# Patient Record
Sex: MALE | Race: WHITE | Employment: UNEMPLOYED | URBAN - METROPOLITAN AREA
[De-identification: names, ages, dates, MRNs, and addresses within clinical notes are randomized per-mention and may not be internally consistent; named-entity substitution may affect disease eponyms.]

---

## 2023-03-01 ENCOUNTER — HOSPITAL ENCOUNTER (EMERGENCY)
Age: 73
Discharge: NURSING, REHAB OR LONG-TERM CARE | End: 2023-03-01
Attending: EMERGENCY MEDICINE
Payer: COMMERCIAL

## 2023-03-01 ENCOUNTER — APPOINTMENT (OUTPATIENT)
Dept: GENERAL RADIOLOGY | Age: 73
End: 2023-03-01
Payer: COMMERCIAL

## 2023-03-01 VITALS
DIASTOLIC BLOOD PRESSURE: 84 MMHG | TEMPERATURE: 97.4 F | HEART RATE: 89 BPM | OXYGEN SATURATION: 96 % | SYSTOLIC BLOOD PRESSURE: 134 MMHG | RESPIRATION RATE: 14 BRPM

## 2023-03-01 DIAGNOSIS — R07.9 CHEST PAIN, UNSPECIFIED TYPE: Primary | ICD-10-CM

## 2023-03-01 LAB
ALBUMIN SERPL-MCNC: 4.1 GM/DL (ref 3.4–5)
ALP BLD-CCNC: 172 IU/L (ref 40–129)
ALT SERPL-CCNC: 25 U/L (ref 10–40)
ANION GAP SERPL CALCULATED.3IONS-SCNC: 8 MMOL/L (ref 4–16)
AST SERPL-CCNC: 31 IU/L (ref 15–37)
BASOPHILS ABSOLUTE: 0.1 K/CU MM
BASOPHILS RELATIVE PERCENT: 1 % (ref 0–1)
BILIRUB SERPL-MCNC: 0.3 MG/DL (ref 0–1)
BUN SERPL-MCNC: 21 MG/DL (ref 6–23)
CALCIUM SERPL-MCNC: 9 MG/DL (ref 8.3–10.6)
CHLORIDE BLD-SCNC: 94 MMOL/L (ref 99–110)
CO2: 29 MMOL/L (ref 21–32)
CREAT SERPL-MCNC: 1.2 MG/DL (ref 0.9–1.3)
DIFFERENTIAL TYPE: ABNORMAL
EKG ATRIAL RATE: 220 BPM
EKG DIAGNOSIS: NORMAL
EKG Q-T INTERVAL: 334 MS
EKG QRS DURATION: 80 MS
EKG QTC CALCULATION (BAZETT): 417 MS
EKG R AXIS: -23 DEGREES
EKG T AXIS: 76 DEGREES
EKG VENTRICULAR RATE: 94 BPM
EOSINOPHILS ABSOLUTE: 0.3 K/CU MM
EOSINOPHILS RELATIVE PERCENT: 4.4 % (ref 0–3)
GFR SERPL CREATININE-BSD FRML MDRD: >60 ML/MIN/1.73M2
GLUCOSE SERPL-MCNC: 140 MG/DL (ref 70–99)
HCT VFR BLD CALC: 44.8 % (ref 42–52)
HEMOGLOBIN: 14 GM/DL (ref 13.5–18)
IMMATURE NEUTROPHIL %: 1 % (ref 0–0.43)
LYMPHOCYTES ABSOLUTE: 1.5 K/CU MM
LYMPHOCYTES RELATIVE PERCENT: 21.3 % (ref 24–44)
MCH RBC QN AUTO: 26.3 PG (ref 27–31)
MCHC RBC AUTO-ENTMCNC: 31.3 % (ref 32–36)
MCV RBC AUTO: 84.2 FL (ref 78–100)
MONOCYTES ABSOLUTE: 0.9 K/CU MM
MONOCYTES RELATIVE PERCENT: 12.6 % (ref 0–4)
NUCLEATED RBC %: 0 %
PDW BLD-RTO: 15.9 % (ref 11.7–14.9)
PLATELET # BLD: 250 K/CU MM (ref 140–440)
PMV BLD AUTO: 10 FL (ref 7.5–11.1)
POTASSIUM SERPL-SCNC: 4.1 MMOL/L (ref 3.5–5.1)
RBC # BLD: 5.32 M/CU MM (ref 4.6–6.2)
SEGMENTED NEUTROPHILS ABSOLUTE COUNT: 4.1 K/CU MM
SEGMENTED NEUTROPHILS RELATIVE PERCENT: 59.7 % (ref 36–66)
SODIUM BLD-SCNC: 131 MMOL/L (ref 135–145)
TOTAL IMMATURE NEUTOROPHIL: 0.07 K/CU MM
TOTAL NUCLEATED RBC: 0 K/CU MM
TOTAL PROTEIN: 7.6 GM/DL (ref 6.4–8.2)
TROPONIN T: <0.01 NG/ML
WBC # BLD: 6.8 K/CU MM (ref 4–10.5)

## 2023-03-01 PROCEDURE — 93010 ELECTROCARDIOGRAM REPORT: CPT | Performed by: INTERNAL MEDICINE

## 2023-03-01 PROCEDURE — 80053 COMPREHEN METABOLIC PANEL: CPT

## 2023-03-01 PROCEDURE — 99283 EMERGENCY DEPT VISIT LOW MDM: CPT

## 2023-03-01 PROCEDURE — 84484 ASSAY OF TROPONIN QUANT: CPT

## 2023-03-01 PROCEDURE — 71045 X-RAY EXAM CHEST 1 VIEW: CPT

## 2023-03-01 PROCEDURE — 85025 COMPLETE CBC W/AUTO DIFF WBC: CPT

## 2023-03-01 PROCEDURE — 93005 ELECTROCARDIOGRAM TRACING: CPT | Performed by: EMERGENCY MEDICINE

## 2023-03-01 RX ORDER — ALLOPURINOL 100 MG/1
100 TABLET ORAL DAILY
COMMUNITY

## 2023-03-01 RX ORDER — TEMAZEPAM 15 MG/1
7.5 CAPSULE ORAL NIGHTLY PRN
COMMUNITY

## 2023-03-01 RX ORDER — GUAIFENESIN AND DEXTROMETHORPHAN HYDROBROMIDE 100; 10 MG/5ML; MG/5ML
5 SOLUTION ORAL EVERY 4 HOURS PRN
COMMUNITY

## 2023-03-01 RX ORDER — ASPIRIN 81 MG/1
81 TABLET ORAL DAILY
COMMUNITY

## 2023-03-01 RX ORDER — TAMSULOSIN HYDROCHLORIDE 0.4 MG/1
0.4 CAPSULE ORAL DAILY
COMMUNITY

## 2023-03-01 RX ORDER — DILTIAZEM HYDROCHLORIDE 240 MG/1
240 CAPSULE, EXTENDED RELEASE ORAL DAILY
COMMUNITY

## 2023-03-01 RX ORDER — TRAZODONE HYDROCHLORIDE 100 MG/1
100 TABLET ORAL NIGHTLY
COMMUNITY

## 2023-03-01 RX ORDER — MAGNESIUM OXIDE 400 MG/1
400 TABLET ORAL DAILY
COMMUNITY

## 2023-03-01 RX ORDER — CHOLECALCIFEROL (VITAMIN D3) 1250 MCG
CAPSULE ORAL
COMMUNITY

## 2023-03-01 RX ORDER — ROPINIROLE 0.5 MG/1
0.5 TABLET, FILM COATED ORAL 3 TIMES DAILY
COMMUNITY

## 2023-03-01 RX ORDER — ACETAMINOPHEN 325 MG/1
650 TABLET ORAL EVERY 6 HOURS PRN
COMMUNITY

## 2023-03-01 RX ORDER — HYDROCHLOROTHIAZIDE 25 MG/1
25 TABLET ORAL DAILY
COMMUNITY

## 2023-03-01 RX ORDER — FUROSEMIDE 20 MG/1
20 TABLET ORAL 2 TIMES DAILY
COMMUNITY

## 2023-03-01 RX ORDER — AMOXICILLIN 250 MG
1 CAPSULE ORAL DAILY
COMMUNITY

## 2023-03-01 RX ORDER — DULOXETIN HYDROCHLORIDE 60 MG/1
60 CAPSULE, DELAYED RELEASE ORAL DAILY
COMMUNITY

## 2023-03-01 RX ORDER — BUSPIRONE HYDROCHLORIDE 10 MG/1
10 TABLET ORAL 3 TIMES DAILY
COMMUNITY

## 2023-03-01 RX ORDER — CYCLOBENZAPRINE HCL 5 MG
5 TABLET ORAL 3 TIMES DAILY PRN
COMMUNITY

## 2023-03-01 RX ORDER — POLYETHYLENE GLYCOL 3350 17 G/17G
17 POWDER, FOR SOLUTION ORAL DAILY
COMMUNITY

## 2023-03-01 RX ORDER — TRAMADOL HYDROCHLORIDE 50 MG/1
50 TABLET ORAL EVERY 6 HOURS PRN
COMMUNITY

## 2023-03-01 RX ORDER — PANTOPRAZOLE SODIUM 40 MG/1
40 GRANULE, DELAYED RELEASE ORAL
COMMUNITY

## 2023-03-01 RX ORDER — ATORVASTATIN CALCIUM 40 MG/1
40 TABLET, FILM COATED ORAL DAILY
COMMUNITY

## 2023-03-01 RX ORDER — DIVALPROEX SODIUM 125 MG/1
125 CAPSULE, COATED PELLETS ORAL 2 TIMES DAILY
COMMUNITY

## 2023-03-01 RX ORDER — POTASSIUM CHLORIDE 1.5 G/1.77G
10 POWDER, FOR SOLUTION ORAL 2 TIMES DAILY
COMMUNITY

## 2023-03-01 RX ORDER — CILOSTAZOL 50 MG/1
50 TABLET ORAL 2 TIMES DAILY
COMMUNITY

## 2023-03-01 NOTE — ED NOTES
Report called to Enriqueta at [facility] nursing [facility] at this time.       Adam Villasenor, KIERRA  03/01/23 7086

## 2023-04-25 ENCOUNTER — OFFICE (OUTPATIENT)
Dept: URBAN - METROPOLITAN AREA CLINIC 16 | Facility: CLINIC | Age: 73
End: 2023-04-25
Payer: MEDICAID

## 2023-04-25 VITALS
DIASTOLIC BLOOD PRESSURE: 72 MMHG | OXYGEN SATURATION: 96 % | WEIGHT: 239 LBS | HEART RATE: 110 BPM | SYSTOLIC BLOOD PRESSURE: 128 MMHG | HEIGHT: 68 IN

## 2023-04-25 DIAGNOSIS — R05.9 COUGH, UNSPECIFIED: ICD-10-CM

## 2023-04-25 DIAGNOSIS — R13.10 DYSPHAGIA, UNSPECIFIED: ICD-10-CM

## 2023-04-25 PROCEDURE — 99214 OFFICE O/P EST MOD 30 MIN: CPT | Performed by: INTERNAL MEDICINE

## 2023-04-25 NOTE — SERVICEHPINOTES
Justin Bullock   is a   72   year old   male   patient who is seen   at the request of   Osvaldo Murrell   for a consultation/initial visit.Justin is now 72 years old and presents for consultation in regards to dysphagia symptomatology he does have problematic cough and he does have a history of reflux and was sent for consideration for EGD. He does have a history of Alzheimer's and history of depression and a history of high blood pressure kidney failure, gout seizures restless leg syndrome. He also has a history of atrial fibrillation as well.  I will schedule the EGD with dilatation in the hospital accordingly. He did mention to me that he had COVID about a year or so ago and that was quite problematic
br
br He did have issues with needing a cholecystectomy as well at that time   and he has been given cough medications that do not help. I will continue him on PPI as well

## 2023-04-25 NOTE — SERVICENOTES
He has coughing to the point where he grabs his head hit that he is so uncomfortable.  Regardless he has a history of reflux and some dysphagia in the upper esophageal region as well and his cardiologist is through [facility] as well

## 2023-06-02 ENCOUNTER — INPATIENT HOSPITAL (OUTPATIENT)
Dept: URBAN - METROPOLITAN AREA HOSPITAL 56 | Facility: HOSPITAL | Age: 73
End: 2023-06-02

## 2023-06-02 DIAGNOSIS — K62.5 HEMORRHAGE OF ANUS AND RECTUM: ICD-10-CM

## 2023-06-02 DIAGNOSIS — K57.30 DIVERTICULOSIS OF LARGE INTESTINE WITHOUT PERFORATION OR ABS: ICD-10-CM

## 2023-06-02 DIAGNOSIS — D62 ACUTE POSTHEMORRHAGIC ANEMIA: ICD-10-CM

## 2023-06-02 DIAGNOSIS — R10.30 LOWER ABDOMINAL PAIN, UNSPECIFIED: ICD-10-CM

## 2023-06-02 PROCEDURE — 99222 1ST HOSP IP/OBS MODERATE 55: CPT | Performed by: PHYSICIAN ASSISTANT

## 2023-06-03 ENCOUNTER — INPATIENT HOSPITAL (OUTPATIENT)
Dept: URBAN - METROPOLITAN AREA HOSPITAL 56 | Facility: HOSPITAL | Age: 73
End: 2023-06-03

## 2023-06-03 DIAGNOSIS — K62.5 HEMORRHAGE OF ANUS AND RECTUM: ICD-10-CM

## 2023-06-03 DIAGNOSIS — K57.33 DIVERTICULITIS OF LARGE INTESTINE WITHOUT PERFORATION OR ABS: ICD-10-CM

## 2023-06-03 PROCEDURE — 45378 DIAGNOSTIC COLONOSCOPY: CPT | Performed by: INTERNAL MEDICINE

## 2025-07-15 NOTE — ED PROVIDER NOTES
Triage Chief Complaint:   Chest Pain (Started earlier tonight. Hx of MI. Given aspirin by EMS)    [place]:  Kellen Schneider is a 67 y.o. male that presents via EMS for chest pain from nursing facility. The patient has a history of CAD, CHF, atrial fibrillation. States that he has the symptoms on a regular basis and is unsure of why staff called EMS tonight. States that his pain is since resolved and started within the last hour or 2, located in the center of his chest associate with shortness of breath. Denies alleviating or exacerbating factors. Denies any fever, cough, nausea, vomiting, diarrhea, abdominal pain. He has no other acute complaints. ROS:  At least 10 systems reviewed and otherwise acutely negative except as in the [address] Past Medical History:   Diagnosis Date    Acute kidney failure (HCC)     Age-related nuclear cataract, bilateral     Atherosclerotic heart disease of native coronary artery without angina pectoris     Benign prostatic hyperplasia with lower urinary tract symptoms     Cellulitis of left lower limb     Cellulitis of right lower limb     Chronic cholecystitis     Constipation     Dysphagia     Dysuria     Enterocolitis due to Clostridioides difficile     Extended spectrum beta lactamase (ESBL) resistance     Gastroesophageal reflux disease without esophagitis     Generalized anxiety disorder     Gout     Hyperlipidemia     Hypertension     Hypertensive retinopathy     Insomnia     Major depressive disorder     Myocardial injury     Osteoarthritis     Other seizures ([address]     Other specified anxiety disorders     Pain in left knee     Paroxysmal A-fib (HCC)     Peripheral vascular disease (HCC)     Restless leg syndrome     Retained cholelithiasis following cholecystectomy     Vascular dementia, mild, without behavioral disturbance, psychotic disturbance, mood disturbance, and anxiety      No past surgical history on file. No family history on file.   Social History Socioeconomic History    Marital status: Single     Spouse name: Not on file    Number of children: Not on file    Years of education: Not on file    Highest education level: Not on file   Occupational History    Not on file   Tobacco Use    Smoking status: Not on file    Smokeless tobacco: Not on file   Substance and Sexual Activity    Alcohol use: Not on file    Drug use: Not on file    Sexual activity: Not on file   Other Topics Concern    Not on file   Social History Narrative    Not on file     Social Determinants of Health     Financial Resource Strain: Not on file   Food Insecurity: Not on file   Transportation Needs: Not on file   Physical Activity: Not on file   Stress: Not on file   Social Connections: Not on file   Intimate Partner Violence: Not on file   Housing Stability: Not on file     No current facility-administered medications for this encounter.      Current Outpatient Medications   Medication Sig Dispense Refill    furosemide (LASIX) 20 MG tablet Take 20 mg by mouth 2 times daily      potassium chloride (KLOR-CON) 20 MEQ packet Take 10 mEq by mouth 2 times daily      Cholecalciferol (VITAMIN D3) 1.25 MG (58290 UT) CAPS Take by mouth      busPIRone (BUSPAR) 10 MG tablet Take 10 mg by mouth 3 times daily      traZODone (DESYREL) 100 MG tablet Take 100 mg by mouth nightly      dilTIAZem (DILACOR XR) 240 MG extended release capsule Take 240 mg by mouth daily      rOPINIRole (REQUIP) 0.5 MG tablet Take 0.5 mg by mouth 3 times daily      divalproex (DEPAKOTE SPRINKLE) 125 MG DR capsule Take 125 mg by mouth 2 times daily      DULoxetine (CYMBALTA) 60 MG extended release capsule Take 60 mg by mouth daily      cyclobenzaprine (FLEXERIL) 5 MG tablet Take 5 mg by mouth 3 times daily as needed for Muscle spasms      allopurinol (ZYLOPRIM) 100 MG tablet Take 100 mg by mouth daily      aspirin 81 MG EC tablet Take 81 mg by mouth daily      tamsulosin (FLOMAX) 0.4 MG capsule Take 0.4 mg by mouth daily magnesium oxide (MAG-OX) 400 MG tablet Take 400 mg by mouth daily      pantoprazole sodium (PROTONIX) 40 MG PACK packet Take 40 mg by mouth every morning (before breakfast)      atorvastatin (LIPITOR) 40 MG tablet Take 40 mg by mouth daily      senna-docusate (PERICOLACE) 8.6-50 MG per tablet Take 1 tablet by mouth daily      traMADol (ULTRAM) 50 MG tablet Take 50 mg by mouth every 6 hours as needed for Pain. Dextromethorphan-guaiFENesin  MG/5ML SYRP Take 5 mLs by mouth every 4 hours as needed for Cough      temazepam (RESTORIL) 15 MG capsule Take 7.5 mg by mouth nightly as needed for Sleep.      hydroCHLOROthiazide (HYDRODIURIL) 25 MG tablet Take 25 mg by mouth daily      acetaminophen (TYLENOL) 325 MG tablet Take 650 mg by mouth every 6 hours as needed for Pain      diclofenac sodium (VOLTAREN) 1 % GEL Apply topically 2 times daily      cilostazol (PLETAL) 50 MG tablet Take 50 mg by mouth 2 times daily      polyethylene glycol (GLYCOLAX) 17 GM/SCOOP powder Take 17 g by mouth daily      apixaban (ELIQUIS) 5 MG TABS tablet Take by mouth 2 times daily       No Known Allergies    Nursing Notes Reviewed    Physical Exam:  ED Triage Vitals   Enc Vitals Group      BP       Pulse       Resp       Temp       Temp src       SpO2       Weight       Height       Head Circumference       Peak Flow       Pain Score       Pain Loc       Pain Edu? Excl. in [address]? GENERAL APPEARANCE: Awake and alert. Cooperative. No acute distress. HEENT: Head NC/AT, EOM's grossly intact. Conjunctiva anicteric. Mucous membranes moist. Tolerates saliva. No trismus. Neck supple. Trachea midline. HEART: irregular rhythm. Radial pulses 2+. LUNGS: Respirations unlabored. CTAB  ABDOMEN: Soft. Non-tender. No guarding or rebound. EXTREMITIES: No acute deformities. Bilateral lower extremity pitting edema  SKIN: Warm and dry. NEUROLOGICAL: No gross facial drooping. Moves all 4 extremities spontaneously.   PSYCHIATRIC: Normal mood.    I have reviewed and interpreted all of the currently available lab results from this visit (if applicable):  Results for orders placed or performed during the hospital encounter of 03/01/23   CBC with Auto Differential   Result Value Ref Range    WBC 6.8 4.0 - 10.5 K/CU MM    RBC 5.32 4.6 - 6.2 M/CU MM    Hemoglobin 14.0 13.5 - 18.0 GM/DL    Hematocrit 44.8 42 - 52 %    MCV 84.2 78 - 100 FL    MCH 26.3 (L) 27 - 31 PG    MCHC 31.3 (L) 32.0 - 36.0 %    RDW 15.9 (H) 11.7 - 14.9 %    Platelets 729 959 - 097 K/CU MM    MPV 10.0 7.5 - 11.1 FL    Differential Type AUTOMATED DIFFERENTIAL     Segs Relative 59.7 36 - 66 %    Lymphocytes % 21.3 (L) 24 - 44 %    Monocytes % 12.6 (H) 0 - 4 %    Eosinophils % 4.4 (H) 0 - 3 %    Basophils % 1.0 0 - 1 %    Segs Absolute 4.1 K/CU MM    Lymphocytes Absolute 1.5 K/CU MM    Monocytes Absolute 0.9 K/CU MM    Eosinophils Absolute 0.3 K/CU MM    Basophils Absolute 0.1 K/CU MM    Nucleated RBC % 0.0 %    Total Nucleated RBC 0.0 K/CU MM    Total Immature Neutrophil 0.07 K/CU MM    Immature Neutrophil % 1.0 (H) 0 - 0.43 %   Comprehensive Metabolic Panel   Result Value Ref Range    Sodium 131 (L) 135 - 145 MMOL/L    Potassium 4.1 3.5 - 5.1 MMOL/L    Chloride 94 (L) 99 - 110 mMol/L    CO2 29 21 - 32 MMOL/L    BUN 21 6 - 23 MG/DL    Creatinine 1.2 0.9 - 1.3 MG/DL    Est, Glom Filt Rate >60 >60 mL/min/1.73m2    Glucose 140 (H) 70 - 99 MG/DL    Calcium 9.0 8.3 - 10.6 MG/DL    Albumin 4.1 3.4 - 5.0 GM/DL    Total Protein 7.6 6.4 - 8.2 GM/DL    Total Bilirubin 0.3 0.0 - 1.0 MG/DL    ALT 25 10 - 40 U/L    AST 31 15 - 37 IU/L    Alkaline Phosphatase 172 (H) 40 - 129 IU/L    Anion Gap 8 4 - 16   Troponin   Result Value Ref Range    Troponin T <0.010 <0.01 NG/ML   EKG 12 Lead   Result Value Ref Range    Ventricular Rate 94 BPM    Atrial Rate 220 BPM    QRS Duration 80 ms    Q-T Interval 334 ms    QTc Calculation (Bazett) 417 ms    R Axis -23 degrees    T Axis 76 degrees    Diagnosis Atrial fibrillation  Inferior infarct , age undetermined  Abnormal ECG  No previous ECGs available        Radiographs (if obtained):  [] The following radiograph was interpreted by myself in the absence of a radiologist:  [x] Radiologist's Report Reviewed:    Medical Decision Making and ED Course:    CC/HPI Summary, DDx, ED Course, and Reassessment: Patient presents as above. He is in no acute distress. Vital signs within appropriate ranges at this time. He is currently chest pain-free and presents with now resolved chest pain and shortness of breath which she states that he has on almost a daily basis. At this point, the exact cause of the patient's current chest pain is unknown. Initial EKG is nondiagnostic and initial troponin testing protocols are within normal limits. At the present time, I doubt pulmonary embolus secondary to the lack of tachycardia, tachypnea, or hypoxia. Similarly, I doubt aortic dissection or abdominal aortic aneurysm secondary to history and description of pain, the patient's nonfocal vascular examination in all four extremities, and CXR unremarkable for signs of mediastinal widening. I also doubt pneumothorax given good bilateral breath sounds and chest x-ray with good lung markings out to the periphery. No signs suggestive of pneumonia on history or physical exam as well. Pericarditis and myocarditis unlikely based on history, EKG findings, and normal cardiac markers. Patient's pain is chronic in nature and daily and I have low suspicion for acute coronary syndrome at this time. Patient resting comfortably on reevaluation with no recurrence of symptoms. I feel that he is appropriate for discharge back to facility and outpatient follow-up. He is agreeable with this plan of care.     History from : Patient    Limitations to history : None    Patient was given the following medications:  Medications - No data to display    Independent Imaging Interpretation by me:     EKG (if obtained): (All EKG's are interpreted by myself in the absence of a cardiologist) 12 lead EKG as interpreted by me reveals irregularly irregular rhythm. Axis is normal. There are no ischemic ST elevations or other suspicious ST changes;  QRS interval is narrow, QT interval is not prolonged. Final Interpretation: Atrial fibrillation. Age-indeterminate inferior infarct      Chronic conditions affecting care: CAD, CHF, afib    Discussion with Other Profesionals : None    Social Determinants : None    Records Reviewed : None available    Disposition Considerations (tests considered but not done, Shared Decision Making, Pt Expectation of Test or Tx.):     Appropriate for outpatient management      I am the Primary Clinician of Record. Clinical Impression:  1.  Chest pain, unspecified type      (Please note that portions of this note may have been completed with a voice recognition program. Efforts were made to edit the dictations but occasionally words are mis-transcribed.)    MD Will Barrera MD  03/01/23 8765 VTE Assessment already completed for this visit